# Patient Record
Sex: FEMALE | Race: WHITE | NOT HISPANIC OR LATINO | Employment: OTHER | URBAN - NONMETROPOLITAN AREA
[De-identification: names, ages, dates, MRNs, and addresses within clinical notes are randomized per-mention and may not be internally consistent; named-entity substitution may affect disease eponyms.]

---

## 2018-01-22 ENCOUNTER — TRANSCRIBE ORDERS (OUTPATIENT)
Dept: MAMMOGRAPHY | Facility: HOSPITAL | Age: 58
End: 2018-01-22

## 2018-01-22 DIAGNOSIS — Z12.39 BREAST CANCER SCREENING: Primary | ICD-10-CM

## 2024-06-18 ENCOUNTER — PREP FOR SURGERY (OUTPATIENT)
Dept: OTHER | Facility: HOSPITAL | Age: 64
End: 2024-06-18
Payer: MEDICARE

## 2024-06-18 DIAGNOSIS — H25.11 NUCLEAR SCLEROTIC CATARACT OF RIGHT EYE: Primary | ICD-10-CM

## 2024-06-18 RX ORDER — SODIUM CHLORIDE 9 MG/ML
40 INJECTION, SOLUTION INTRAVENOUS AS NEEDED
OUTPATIENT
Start: 2024-06-18

## 2024-06-18 RX ORDER — CYCLOPENT/TROPIC/PHEN/KETR/WAT 1%-1%-2.5%
1 DROPS (EA) OPHTHALMIC (EYE)
OUTPATIENT
Start: 2024-06-18 | End: 2024-06-18

## 2024-06-18 RX ORDER — TETRACAINE HYDROCHLORIDE 5 MG/ML
1 SOLUTION OPHTHALMIC SEE ADMIN INSTRUCTIONS
OUTPATIENT
Start: 2024-06-18

## 2024-06-18 RX ORDER — SODIUM CHLORIDE 0.9 % (FLUSH) 0.9 %
10 SYRINGE (ML) INJECTION EVERY 12 HOURS SCHEDULED
OUTPATIENT
Start: 2024-06-18

## 2024-06-18 RX ORDER — SODIUM CHLORIDE 0.9 % (FLUSH) 0.9 %
1-10 SYRINGE (ML) INJECTION AS NEEDED
OUTPATIENT
Start: 2024-06-18

## 2024-06-18 RX ORDER — PREDNISOLONE ACETATE 10 MG/ML
1 SUSPENSION/ DROPS OPHTHALMIC SEE ADMIN INSTRUCTIONS
OUTPATIENT
Start: 2024-06-18

## 2024-06-24 PROBLEM — H25.11 NUCLEAR SCLEROTIC CATARACT OF RIGHT EYE: Status: ACTIVE | Noted: 2024-06-18

## 2024-06-26 RX ORDER — HYDROXYZINE PAMOATE 25 MG/1
25 CAPSULE ORAL
COMMUNITY

## 2024-06-26 RX ORDER — OMEPRAZOLE 20 MG/1
20 CAPSULE, DELAYED RELEASE ORAL DAILY
COMMUNITY

## 2024-06-26 RX ORDER — FLUOXETINE HYDROCHLORIDE 20 MG/1
20 CAPSULE ORAL DAILY
COMMUNITY

## 2024-06-26 NOTE — PRE-PROCEDURE INSTRUCTIONS
PAT phone history completed with patient for upcoming procedure on 6/27/24 with Dr. Coley.    PAT PASS reviewed with patient and they verbalize understanding of the following:     Do not eat or drink anything after midnight the night before procedure unless otherwise instructed by physician/surgeon's office, this includes no gum, candy, mints, tobacco products or e-cigarettes.  Do not shave the area to be operated on at least 48 hours prior to procedure.  Do not wear makeup, lotion, hair products, or nail polish.  Do not wear any jewelry and remove all piercings.  Do not wear any adhesive if you wear dentures.  Do not wear contacts; bring in glasses if needed.  Only take medications on the morning of procedure as instructed by PAT nurse per anesthesia guidelines or as instructed by physician's office.  If you are on any blood thinners reach out to the physician/surgeon's office for instructions on when/if they will need to be stopped prior to procedure.  Bring in picture ID and insurance card, advanced directive copies if applicable, CPAP/BIPAP/Inhalers if indicated morning of procedure, leave any other valuables at home.  Ensure you have arranged for someone to drive you home the day of your procedure and someone to care for you at home afterwards. It is recommended that you do not drive, drink alcohol, or make any major legal decisions for at least 24 hours after your procedure is complete.    Instructions given on hospital entrance and registration location.

## 2024-06-27 ENCOUNTER — HOSPITAL ENCOUNTER (OUTPATIENT)
Facility: HOSPITAL | Age: 64
Setting detail: HOSPITAL OUTPATIENT SURGERY
Discharge: HOME | End: 2024-06-27
Attending: OPHTHALMOLOGY | Admitting: OPHTHALMOLOGY
Payer: MEDICARE

## 2024-06-27 ENCOUNTER — ANESTHESIA (OUTPATIENT)
Dept: PERIOP | Facility: HOSPITAL | Age: 64
End: 2024-06-27
Payer: MEDICARE

## 2024-06-27 ENCOUNTER — ANESTHESIA EVENT (OUTPATIENT)
Dept: PERIOP | Facility: HOSPITAL | Age: 64
End: 2024-06-27
Payer: MEDICARE

## 2024-06-27 VITALS
OXYGEN SATURATION: 97 % | WEIGHT: 196 LBS | SYSTOLIC BLOOD PRESSURE: 131 MMHG | TEMPERATURE: 97 F | HEIGHT: 60 IN | RESPIRATION RATE: 18 BRPM | DIASTOLIC BLOOD PRESSURE: 73 MMHG | HEART RATE: 74 BPM | BODY MASS INDEX: 38.48 KG/M2

## 2024-06-27 DIAGNOSIS — H25.11 NUCLEAR SCLEROTIC CATARACT OF RIGHT EYE: ICD-10-CM

## 2024-06-27 PROCEDURE — 25010000002 FENTANYL CITRATE (PF) 50 MCG/ML SOLUTION PREFILLED SYRINGE: Performed by: NURSE ANESTHETIST, CERTIFIED REGISTERED

## 2024-06-27 PROCEDURE — V2632 POST CHMBR INTRAOCULAR LENS: HCPCS | Performed by: OPHTHALMOLOGY

## 2024-06-27 PROCEDURE — 25810000003 LACTATED RINGERS PER 1000 ML: Performed by: OPHTHALMOLOGY

## 2024-06-27 PROCEDURE — 25010000002 MIDAZOLAM PER 1MG: Performed by: NURSE ANESTHETIST, CERTIFIED REGISTERED

## 2024-06-27 DEVICE — LENS MONOFOCAL IQ CC60WF220: Type: IMPLANTABLE DEVICE | Site: POSTERIOR CHAMBER | Status: FUNCTIONAL

## 2024-06-27 RX ORDER — CYCLOPENT/TROPIC/PHEN/KETR/WAT 1%-1%-2.5%
1 DROPS (EA) OPHTHALMIC (EYE)
Status: COMPLETED | OUTPATIENT
Start: 2024-06-27 | End: 2024-06-27

## 2024-06-27 RX ORDER — SODIUM CHLORIDE 0.9 % (FLUSH) 0.9 %
10 SYRINGE (ML) INJECTION EVERY 12 HOURS SCHEDULED
Status: DISCONTINUED | OUTPATIENT
Start: 2024-06-27 | End: 2024-06-27 | Stop reason: HOSPADM

## 2024-06-27 RX ORDER — ACETAZOLAMIDE 500 MG/1
500 CAPSULE, EXTENDED RELEASE ORAL ONCE
Status: DISCONTINUED | OUTPATIENT
Start: 2024-06-27 | End: 2024-06-27 | Stop reason: HOSPADM

## 2024-06-27 RX ORDER — SODIUM CHLORIDE, SODIUM LACTATE, POTASSIUM CHLORIDE, CALCIUM CHLORIDE 600; 310; 30; 20 MG/100ML; MG/100ML; MG/100ML; MG/100ML
1000 INJECTION, SOLUTION INTRAVENOUS CONTINUOUS
Status: DISCONTINUED | OUTPATIENT
Start: 2024-06-27 | End: 2024-06-27 | Stop reason: HOSPADM

## 2024-06-27 RX ORDER — BALANCED SALT SOLUTION 6.4; .75; .48; .3; 3.9; 1.7 MG/ML; MG/ML; MG/ML; MG/ML; MG/ML; MG/ML
SOLUTION OPHTHALMIC AS NEEDED
Status: DISCONTINUED | OUTPATIENT
Start: 2024-06-27 | End: 2024-06-27 | Stop reason: HOSPADM

## 2024-06-27 RX ORDER — SODIUM CHLORIDE 0.9 % (FLUSH) 0.9 %
1-10 SYRINGE (ML) INJECTION AS NEEDED
Status: DISCONTINUED | OUTPATIENT
Start: 2024-06-27 | End: 2024-06-27 | Stop reason: HOSPADM

## 2024-06-27 RX ORDER — LIDOCAINE HYDROCHLORIDE 40 MG/ML
INJECTION, SOLUTION RETROBULBAR AS NEEDED
Status: DISCONTINUED | OUTPATIENT
Start: 2024-06-27 | End: 2024-06-27 | Stop reason: HOSPADM

## 2024-06-27 RX ORDER — PREDNISOLONE ACETATE 10 MG/ML
SUSPENSION/ DROPS OPHTHALMIC AS NEEDED
Status: DISCONTINUED | OUTPATIENT
Start: 2024-06-27 | End: 2024-06-27 | Stop reason: HOSPADM

## 2024-06-27 RX ORDER — MIDAZOLAM HYDROCHLORIDE 2 MG/2ML
INJECTION, SOLUTION INTRAMUSCULAR; INTRAVENOUS AS NEEDED
Status: DISCONTINUED | OUTPATIENT
Start: 2024-06-27 | End: 2024-06-27 | Stop reason: SURG

## 2024-06-27 RX ORDER — FENTANYL CITRATE 50 UG/ML
INJECTION, SOLUTION INTRAMUSCULAR; INTRAVENOUS AS NEEDED
Status: DISCONTINUED | OUTPATIENT
Start: 2024-06-27 | End: 2024-06-27 | Stop reason: SURG

## 2024-06-27 RX ORDER — TETRACAINE HYDROCHLORIDE 5 MG/ML
SOLUTION OPHTHALMIC AS NEEDED
Status: DISCONTINUED | OUTPATIENT
Start: 2024-06-27 | End: 2024-06-27 | Stop reason: HOSPADM

## 2024-06-27 RX ORDER — PREDNISOLONE ACETATE 10 MG/ML
1 SUSPENSION/ DROPS OPHTHALMIC 4 TIMES DAILY
Start: 2024-06-27

## 2024-06-27 RX ORDER — TETRACAINE HYDROCHLORIDE 5 MG/ML
1 SOLUTION OPHTHALMIC SEE ADMIN INSTRUCTIONS
Status: COMPLETED | OUTPATIENT
Start: 2024-06-27 | End: 2024-06-27

## 2024-06-27 RX ORDER — SODIUM CHLORIDE 9 MG/ML
40 INJECTION, SOLUTION INTRAVENOUS AS NEEDED
Status: DISCONTINUED | OUTPATIENT
Start: 2024-06-27 | End: 2024-06-27 | Stop reason: HOSPADM

## 2024-06-27 RX ORDER — NEOMYCIN SULFATE, POLYMYXIN B SULFATE, AND DEXAMETHASONE 3.5; 10000; 1 MG/G; [USP'U]/G; MG/G
OINTMENT OPHTHALMIC AS NEEDED
Status: DISCONTINUED | OUTPATIENT
Start: 2024-06-27 | End: 2024-06-27 | Stop reason: HOSPADM

## 2024-06-27 RX ORDER — PREDNISOLONE ACETATE 10 MG/ML
1 SUSPENSION/ DROPS OPHTHALMIC SEE ADMIN INSTRUCTIONS
Status: DISCONTINUED | OUTPATIENT
Start: 2024-06-27 | End: 2024-06-27 | Stop reason: HOSPADM

## 2024-06-27 RX ADMIN — MIDAZOLAM HYDROCHLORIDE 2 MG: 1 INJECTION, SOLUTION INTRAMUSCULAR; INTRAVENOUS at 11:12

## 2024-06-27 RX ADMIN — TETRACAINE HYDROCHLORIDE 1 DROP: 5 SOLUTION OPHTHALMIC at 10:14

## 2024-06-27 RX ADMIN — SODIUM CHLORIDE, POTASSIUM CHLORIDE, SODIUM LACTATE AND CALCIUM CHLORIDE 1000 ML: 600; 310; 30; 20 INJECTION, SOLUTION INTRAVENOUS at 10:22

## 2024-06-27 RX ADMIN — Medication 1 DROP: at 10:25

## 2024-06-27 RX ADMIN — TETRACAINE HYDROCHLORIDE 1 DROP: 5 SOLUTION OPHTHALMIC at 10:13

## 2024-06-27 RX ADMIN — Medication 1 DROP: at 10:15

## 2024-06-27 RX ADMIN — Medication 1 DROP: at 10:20

## 2024-06-27 RX ADMIN — FENTANYL CITRATE 50 MCG: 50 INJECTION, SOLUTION INTRAMUSCULAR; INTRAVENOUS at 11:12

## 2024-06-27 NOTE — OP NOTE
OPERATIVE NOTE    Date of Procedure: 6/27/2024  Patient Name: Francesca Montana  Patient MRN: [id number]  YOB: 1960     Preoperative Diagnosis: Right nuclear sclerotic cataract.     Postoperative Diagnosis: Right nuclear sclerotic cataract.     Procedure Performed: Phacoemulsification with implantation of a foldable posterior chamber intraocular lens, Right eye.     Surgeon: Daron Coley MD    Anesthesia:  Monitored Anesthesia Care (MAC)      Brief History and Indication: The patient presents with a history of past progressive loss of vision.  Patient was diagnosed with cataract and requests removal for increased ability to read and see.     Operation Description: The patient was taken to the OR and prepped and draped in the usual sterile ophthalmic fashion. A lid speculum was placed in the eye.  A #75 blade was then used to make a stab incision two o’clock hours from the intended temporal clear cornea groove. The anterior chamber was then inflated with a Viscoelastic. A metal microkeratome blade was then used to enter the anterior chamber at the temporal clear cornea site. A three level tunnel incision was made. A curvilinear capsulorrhexis was then performed with a bent cystotome needle and capsulorrhexis forceps.  BSS on a 30 gauge bent cannula was used to hydro-dissect, and hydro-delineate the lens. Good fluid waves and hydro-delineation were noted. Phacoemulsification was then used to remove nuclear material without complications. The residual cortical and lenticular material was then removed with irrigation and aspiration. Viscoelastics were then used to inflate the bag in a soft shell technique. A PCIOL was injected into the bag. Post-implantation, there were no rents or tears in the bag and the lens was noted to be stable and centered. The residual Viscoelastic was then removed with irrigation and aspiration.  The wound was checked and found to be without leaks. Therefore a Polydex ointment and one  drop of Durezol eye drop was placed in the eye.     Implant Information:   Implant Name Type Inv. Item Serial No.  Lot No. LRB No. Used Action   LENS MONOFOCAL IQ [id number] - [id number] - [id number] Implant LENS MONOFOCAL IQ [id number] VIVIAN  Right 1 Implanted       Complications: None    Estimated Blood Loss:  Less than 1 cc.       []   Changed to complex procedure due to: []  hypermature, white cataract. Blue dye was used. []   small iris. A Malyugin Ring was used.    Discharge and Condition  The patient was transported to same day surgery in excellent condition and scheduled for follow-up tomorrow morning. The patient was given instructions on use of eye drops for the operative eye and was specifically instructed to call Dr. Coley at his office or home for any nausea, vomiting, headache, decreased visual acuity, or pain, or if the patient had any general concerns.    Daron Coley MD  6/27/2024

## 2024-06-27 NOTE — ANESTHESIA POSTPROCEDURE EVALUATION
Patient: Francesca Montana    Procedure Summary       Date: 06/27/24 Room / Location: [facility] OR 3 /  [facility]    Anesthesia Start: 1112 Anesthesia Stop: 1125    Procedure: CATARACT PHACO EXTRACTION WITH INTRAOCULAR LENS IMPLANT RIGHT (Right: Eye) Diagnosis:       Nuclear sclerotic cataract of right eye      (Nuclear sclerotic cataract of right eye [H25.11])    Surgeons: Daron Coley MD Provider: Manuel Mcdaniel CRNA    Anesthesia Type: MAC ASA Status: 2            Anesthesia Type: MAC    Vitals  No vitals data found for the desired time range.          Post Anesthesia Care and Evaluation    Patient location during evaluation: bedside  Patient participation: complete - patient participated  Level of consciousness: awake  Pain score: 0  Pain management: adequate    Airway patency: patent  Anesthetic complications: No anesthetic complications  PONV Status: controlled  Cardiovascular status: acceptable and stable  Respiratory status: acceptable and room air  Hydration status: acceptable    Comments: Vital signs as noted in nursing documentation as per protocol

## 2024-06-27 NOTE — DISCHARGE INSTRUCTIONS
[facility], [facility]  [address] [place] [address]  (P): [phone]           (F): [phone]    Francesca Montana  PATIENT NAME:__________________________________    Right Eye    POST OPERATIVE INSTRUCTIONS    You have received anesthesia today. DO NOT drive, drink alcohol, sign legal documents.   After surgery, your eye will not hurt. It may feel scratchy, sticky or uncomfortable. Your eye will be sensitive to light.  Most people see better 1-3 days after the procedure, but it could take 3 weeks to get the full benefits and reach your visual potential. If your vision is blurry for a few days it is normal, and means you may have swelling outside or inside the eye. For some patients, a bubble is placed and there will be blurriness.   You should receive a post-op kit with tape and an eye shield. Wear the shield for the first 3 nights after surgery to keep you from rubbing the eye.  Most people are able to return to their normal routine 1-3 days after surgery, however, due to the brain adjusting to your new vision you may have trouble judging distances and want to be careful when driving and going up and downstairs.   You can shower and wash your hair the day after surgery. Keep water, shampoo, hair spray and shaving lotion out of the eye, especially for the first week.  During the first week, you should AVOID:   Rubbing or putting pressure on your eye.  Eye make-up, face cream or lotion, hair coloring or perms  Strenuous activities, such as running or lifting weights, as to avoid sweat from getting in the eye. Avoid swimming, hot tubs, fumes or edinson conditions.   Keep your head above your heart (no hanging the head down for periods of time).  Some discomfort and blurred vision after surgery are normal, but if you have any unusual pain, swelling, bleeding or sudden decrease in vision, contact our office immediately. Emergency assistance is available at any time by calling:    Dr. Daron Paiz  [place]    [phone]    If unable to reach call [facility] @ [phone]      POST OPERATIVE DROP INSTRUCTIONS  You have been prescribed eye drops to use after surgery, please follow these instructions:  PLACE A JOAN IN THE DAY COLUMN EACH TIME YOU USE TO KEEP ON SCHEDULE. WAIT 5 MINUTES IN BETWEEN DROPS    Prednisolone (PINK TOP) SHAKE WELL BEFORE USE   GIVEN TO YOU BY THE HOSPITAL    WEEK 1-USE 4  (FOUR) TIMES A DAY DAY 1   DAY 2 DAY 3 DAY 4 DAY 5 DAY 6 DAY 7     WEEK 2-USE 3 (THREE)  TIMES A DAY DAY 1 DAY 2 DAY 3 DAY 4 DAY 5 DAY 6 DAY 7     WEEK 3-USE 2 (TWO) TIMES A DAY DAY 1 DAY 2 DAY 3 DAY 4 DAY 5 DAY 6 DAY 7     WEEK 4-USE 1 (ONE)TIME A DAY DAY 1 DAY 2 DAY 3 DAY 4 DAY 5 DAY 6 DAY 7         Ketorolac (GRAY TOP) PRESCRIBED TO YOUR PHARMACY    WEEK 1-USE 4  (FOUR) TIMES A DAY DAY 1   DAY 2 DAY 3 DAY 4 DAY 5 DAY 6 DAY 7     WEEK 2-USE 3 (THREE)  TIMES A DAY DAY 1 DAY 2 DAY 3 DAY 4 DAY 5 DAY 6 DAY 7     WEEK 3-USE 2 (TWO) TIMES A DAY DAY 1 DAY 2 DAY 3 DAY 4 DAY 5 DAY 6 DAY 7     WEEK 4-USE 1 (ONE)TIME A DAY DAY 1 DAY 2 DAY 3 DAY 4 DAY 5 DAY 6 DAY 7       Moxifloxacin (TAN TOP) PRESCRIBED TO YOUR PHARMACY    WEEK 1-USE 4  (FOUR) TIMES A DAY DAY 1   DAY 2 DAY 3 DAY 4 DAY 5 DAY 6 DAY 7       No pushing, pulling, tugging,  heavy lifting, or strenuous activity.  No major decision making, driving, or drinking alcoholic beverages for 24 hours. ( due to the medications you have  received)  Always use good hand hygiene/washing techniques.  NO driving while taking pain medications.    * if you have an incision:  Check your incision area every day for signs of infection.   Check for:  * more redness, swelling, or pain  *more fluid or blood  *warmth  *pus or bad smell    To assist you in voiding:  Drink plenty of fluids  Listen to running water while attempting to void.    If you are unable to urinate and you have an uncomfortable urge to void or it has been   6  hours since you were discharged, return to the Emergency Room

## 2024-06-27 NOTE — ANESTHESIA PREPROCEDURE EVALUATION
Anesthesia Evaluation     Patient summary reviewed and Nursing notes reviewed   NPO Solid Status: > 8 hours  NPO Liquid Status: > 8 hours           Airway   Mallampati: II  TM distance: >3 FB  Neck ROM: full  Possible difficult intubation  Dental - normal exam     Pulmonary - normal exam   Cardiovascular - normal exam        Neuro/Psych  (+) psychiatric history Depression  GI/Hepatic/Renal/Endo    (+) GERD well controlled    Musculoskeletal     Abdominal  - normal exam   Substance History      OB/GYN          Other                    Anesthesia Plan    ASA 2     MAC     intravenous induction     Anesthetic plan, risks, benefits, and alternatives have been provided, discussed and informed consent has been obtained with: patient.  Pre-procedure education provided  Plan discussed with CRNA.    CODE STATUS:

## 2024-07-16 ENCOUNTER — PREP FOR SURGERY (OUTPATIENT)
Dept: OTHER | Facility: HOSPITAL | Age: 64
End: 2024-07-16
Payer: MEDICARE

## 2024-07-16 DIAGNOSIS — H25.12 NUCLEAR SCLEROTIC CATARACT OF LEFT EYE: Primary | ICD-10-CM

## 2024-07-16 RX ORDER — PREDNISOLONE ACETATE 10 MG/ML
1 SUSPENSION/ DROPS OPHTHALMIC SEE ADMIN INSTRUCTIONS
OUTPATIENT
Start: 2024-07-16

## 2024-07-16 RX ORDER — SODIUM CHLORIDE 0.9 % (FLUSH) 0.9 %
1-10 SYRINGE (ML) INJECTION AS NEEDED
OUTPATIENT
Start: 2024-07-16

## 2024-07-16 RX ORDER — TETRACAINE HYDROCHLORIDE 5 MG/ML
1 SOLUTION OPHTHALMIC SEE ADMIN INSTRUCTIONS
OUTPATIENT
Start: 2024-07-16

## 2024-07-16 RX ORDER — CYCLOPENT/TROPIC/PHEN/KETR/WAT 1%-1%-2.5%
1 DROPS (EA) OPHTHALMIC (EYE)
OUTPATIENT
Start: 2024-07-16 | End: 2024-07-16

## 2024-07-16 RX ORDER — SODIUM CHLORIDE 0.9 % (FLUSH) 0.9 %
10 SYRINGE (ML) INJECTION EVERY 12 HOURS SCHEDULED
OUTPATIENT
Start: 2024-07-16

## 2024-07-16 RX ORDER — SODIUM CHLORIDE 9 MG/ML
40 INJECTION, SOLUTION INTRAVENOUS AS NEEDED
OUTPATIENT
Start: 2024-07-16

## 2024-07-22 PROBLEM — H25.12 NUCLEAR SCLEROTIC CATARACT OF LEFT EYE: Status: ACTIVE | Noted: 2024-07-16

## 2024-07-24 ENCOUNTER — ANESTHESIA EVENT (OUTPATIENT)
Dept: PERIOP | Facility: HOSPITAL | Age: 64
End: 2024-07-24
Payer: MEDICARE

## 2024-07-24 NOTE — PRE-PROCEDURE INSTRUCTIONS
PAT phone history completed with patient for upcoming procedure on 7/25/24 with Dr. Coley.    PAT PASS reviewed with patient and they verbalize understanding of the following:     Do not eat or drink anything after midnight the night before procedure unless otherwise instructed by physician/surgeon's office, this includes no gum, candy, mints, tobacco products or e-cigarettes.  Do not shave the area to be operated on at least 48 hours prior to procedure.  Do not wear makeup, lotion, hair products, or nail polish.  Do not wear any jewelry and remove all piercings.  Do not wear any adhesive if you wear dentures.  Do not wear contacts; bring in glasses if needed.  Only take medications on the morning of procedure as instructed by PAT nurse per anesthesia guidelines or as instructed by physician's office.  If you are on any blood thinners reach out to the physician/surgeon's office for instructions on when/if they will need to be stopped prior to procedure.  Bring in picture ID and insurance card, advanced directive copies if applicable, CPAP/BIPAP/Inhalers if indicated morning of procedure, leave any other valuables at home.  Ensure you have arranged for someone to drive you home the day of your procedure and someone to care for you at home afterwards. It is recommended that you do not drive, drink alcohol, or make any major legal decisions for at least 24 hours after your procedure is complete.    Instructions given on hospital entrance and registration location.

## 2024-07-24 NOTE — ANESTHESIA PREPROCEDURE EVALUATION
Anesthesia Evaluation     Patient summary reviewed and Nursing notes reviewed   NPO Solid Status: > 8 hours  NPO Liquid Status: > 8 hours           Airway   Mallampati: II  TM distance: >3 FB  Neck ROM: full  Possible difficult intubation  Dental    (+) edentulous    Pulmonary - normal exam   Cardiovascular - normal exam    Rhythm: regular  Rate: normal        Neuro/Psych  (+) psychiatric history Depression  GI/Hepatic/Renal/Endo    (+) GERD well controlled    Musculoskeletal     Abdominal  - normal exam   Substance History      OB/GYN          Other        ROS/Med Hx Other: [place]                  Anesthesia Plan    ASA 2     MAC     intravenous induction     Anesthetic plan, risks, benefits, and alternatives have been provided, discussed and informed consent has been obtained with: patient.  Pre-procedure education provided  Plan discussed with CRNA.      CODE STATUS:

## 2024-07-25 ENCOUNTER — ANESTHESIA (OUTPATIENT)
Dept: PERIOP | Facility: HOSPITAL | Age: 64
End: 2024-07-25
Payer: MEDICARE

## 2024-07-25 ENCOUNTER — HOSPITAL ENCOUNTER (OUTPATIENT)
Facility: HOSPITAL | Age: 64
Setting detail: HOSPITAL OUTPATIENT SURGERY
Discharge: HOME | End: 2024-07-25
Attending: OPHTHALMOLOGY | Admitting: OPHTHALMOLOGY
Payer: MEDICARE

## 2024-07-25 VITALS
HEART RATE: 79 BPM | OXYGEN SATURATION: 97 % | BODY MASS INDEX: 38.48 KG/M2 | RESPIRATION RATE: 17 BRPM | WEIGHT: 196 LBS | TEMPERATURE: 97 F | HEIGHT: 60 IN | SYSTOLIC BLOOD PRESSURE: 142 MMHG | DIASTOLIC BLOOD PRESSURE: 75 MMHG

## 2024-07-25 DIAGNOSIS — H25.12 NUCLEAR SCLEROTIC CATARACT OF LEFT EYE: ICD-10-CM

## 2024-07-25 PROCEDURE — 25010000002 MIDAZOLAM PER 1MG: Performed by: NURSE ANESTHETIST, CERTIFIED REGISTERED

## 2024-07-25 PROCEDURE — 25810000003 LACTATED RINGERS PER 1000 ML: Performed by: OPHTHALMOLOGY

## 2024-07-25 PROCEDURE — 25010000002 FENTANYL CITRATE (PF) 50 MCG/ML SOLUTION PREFILLED SYRINGE: Performed by: NURSE ANESTHETIST, CERTIFIED REGISTERED

## 2024-07-25 PROCEDURE — V2632 POST CHMBR INTRAOCULAR LENS: HCPCS | Performed by: OPHTHALMOLOGY

## 2024-07-25 DEVICE — LENS MONOFOCAL IQ CC60WF215: Type: IMPLANTABLE DEVICE | Site: POSTERIOR CHAMBER | Status: FUNCTIONAL

## 2024-07-25 RX ORDER — SODIUM CHLORIDE 0.9 % (FLUSH) 0.9 %
10 SYRINGE (ML) INJECTION EVERY 12 HOURS SCHEDULED
Status: DISCONTINUED | OUTPATIENT
Start: 2024-07-25 | End: 2024-07-25 | Stop reason: HOSPADM

## 2024-07-25 RX ORDER — LIDOCAINE HYDROCHLORIDE 40 MG/ML
INJECTION, SOLUTION RETROBULBAR AS NEEDED
Status: DISCONTINUED | OUTPATIENT
Start: 2024-07-25 | End: 2024-07-25 | Stop reason: HOSPADM

## 2024-07-25 RX ORDER — PREDNISOLONE ACETATE 10 MG/ML
1 SUSPENSION/ DROPS OPHTHALMIC SEE ADMIN INSTRUCTIONS
Status: DISCONTINUED | OUTPATIENT
Start: 2024-07-25 | End: 2024-07-25 | Stop reason: HOSPADM

## 2024-07-25 RX ORDER — KETAMINE HCL IN NACL, ISO-OSM 100MG/10ML
SYRINGE (ML) INJECTION AS NEEDED
Status: DISCONTINUED | OUTPATIENT
Start: 2024-07-25 | End: 2024-07-25 | Stop reason: SURG

## 2024-07-25 RX ORDER — TETRACAINE HYDROCHLORIDE 5 MG/ML
1 SOLUTION OPHTHALMIC SEE ADMIN INSTRUCTIONS
Status: COMPLETED | OUTPATIENT
Start: 2024-07-25 | End: 2024-07-25

## 2024-07-25 RX ORDER — FENTANYL CITRATE 50 UG/ML
INJECTION, SOLUTION INTRAMUSCULAR; INTRAVENOUS AS NEEDED
Status: DISCONTINUED | OUTPATIENT
Start: 2024-07-25 | End: 2024-07-25 | Stop reason: SURG

## 2024-07-25 RX ORDER — SODIUM CHLORIDE 9 MG/ML
40 INJECTION, SOLUTION INTRAVENOUS AS NEEDED
Status: DISCONTINUED | OUTPATIENT
Start: 2024-07-25 | End: 2024-07-25 | Stop reason: HOSPADM

## 2024-07-25 RX ORDER — SODIUM CHLORIDE 0.9 % (FLUSH) 0.9 %
1-10 SYRINGE (ML) INJECTION AS NEEDED
Status: DISCONTINUED | OUTPATIENT
Start: 2024-07-25 | End: 2024-07-25 | Stop reason: HOSPADM

## 2024-07-25 RX ORDER — CYCLOPENT/TROPIC/PHEN/KETR/WAT 1%-1%-2.5%
1 DROPS (EA) OPHTHALMIC (EYE)
Status: COMPLETED | OUTPATIENT
Start: 2024-07-25 | End: 2024-07-25

## 2024-07-25 RX ORDER — TETRACAINE HYDROCHLORIDE 5 MG/ML
SOLUTION OPHTHALMIC AS NEEDED
Status: DISCONTINUED | OUTPATIENT
Start: 2024-07-25 | End: 2024-07-25 | Stop reason: HOSPADM

## 2024-07-25 RX ORDER — BALANCED SALT SOLUTION 6.4; .75; .48; .3; 3.9; 1.7 MG/ML; MG/ML; MG/ML; MG/ML; MG/ML; MG/ML
SOLUTION OPHTHALMIC AS NEEDED
Status: DISCONTINUED | OUTPATIENT
Start: 2024-07-25 | End: 2024-07-25 | Stop reason: HOSPADM

## 2024-07-25 RX ORDER — IPRATROPIUM BROMIDE AND ALBUTEROL SULFATE 2.5; .5 MG/3ML; MG/3ML
3 SOLUTION RESPIRATORY (INHALATION) ONCE AS NEEDED
Status: DISCONTINUED | OUTPATIENT
Start: 2024-07-25 | End: 2024-07-25 | Stop reason: HOSPADM

## 2024-07-25 RX ORDER — SODIUM CHLORIDE, SODIUM LACTATE, POTASSIUM CHLORIDE, CALCIUM CHLORIDE 600; 310; 30; 20 MG/100ML; MG/100ML; MG/100ML; MG/100ML
1000 INJECTION, SOLUTION INTRAVENOUS CONTINUOUS
Status: DISCONTINUED | OUTPATIENT
Start: 2024-07-25 | End: 2024-07-25 | Stop reason: HOSPADM

## 2024-07-25 RX ORDER — PREDNISOLONE ACETATE 10 MG/ML
SUSPENSION/ DROPS OPHTHALMIC AS NEEDED
Status: DISCONTINUED | OUTPATIENT
Start: 2024-07-25 | End: 2024-07-25 | Stop reason: HOSPADM

## 2024-07-25 RX ORDER — NEOMYCIN SULFATE, POLYMYXIN B SULFATE, AND DEXAMETHASONE 3.5; 10000; 1 MG/G; [USP'U]/G; MG/G
OINTMENT OPHTHALMIC AS NEEDED
Status: DISCONTINUED | OUTPATIENT
Start: 2024-07-25 | End: 2024-07-25 | Stop reason: HOSPADM

## 2024-07-25 RX ORDER — MIDAZOLAM HYDROCHLORIDE 2 MG/2ML
INJECTION, SOLUTION INTRAMUSCULAR; INTRAVENOUS AS NEEDED
Status: DISCONTINUED | OUTPATIENT
Start: 2024-07-25 | End: 2024-07-25 | Stop reason: SURG

## 2024-07-25 RX ADMIN — Medication 10 MG: at 11:58

## 2024-07-25 RX ADMIN — TETRACAINE HYDROCHLORIDE 1 DROP: 5 SOLUTION OPHTHALMIC at 10:20

## 2024-07-25 RX ADMIN — Medication 1 DROP: at 10:27

## 2024-07-25 RX ADMIN — MIDAZOLAM HYDROCHLORIDE 2 MG: 1 INJECTION, SOLUTION INTRAMUSCULAR; INTRAVENOUS at 11:58

## 2024-07-25 RX ADMIN — Medication 1 DROP: at 10:32

## 2024-07-25 RX ADMIN — Medication 1 DROP: at 10:22

## 2024-07-25 RX ADMIN — TETRACAINE HYDROCHLORIDE 1 DROP: 5 SOLUTION OPHTHALMIC at 10:21

## 2024-07-25 RX ADMIN — FENTANYL CITRATE 50 MCG: 50 INJECTION, SOLUTION INTRAMUSCULAR; INTRAVENOUS at 11:58

## 2024-07-25 RX ADMIN — SODIUM CHLORIDE, POTASSIUM CHLORIDE, SODIUM LACTATE AND CALCIUM CHLORIDE 1000 ML: 600; 310; 30; 20 INJECTION, SOLUTION INTRAVENOUS at 10:42

## 2024-07-25 NOTE — H&P
[facility]         History and Physical    Patient Name: Francesca Montana  MRN: [id number]  : 1960  Gender: female     HPI: Patient complaint of PPLOV Left eye diagnosed with cataract. Patient requests PHACO PCIOL for Increase of VA/ADL.    History:    Past Medical History:   Diagnosis Date    Depression     GERD (gastroesophageal reflux disease)     Seasonal allergies     Wears glasses        Past Surgical History:   Procedure Laterality Date    ADENOIDECTOMY      CATARACT EXTRACTION W/ INTRAOCULAR LENS IMPLANT Right 2024    Procedure: CATARACT PHACO EXTRACTION WITH INTRAOCULAR LENS IMPLANT RIGHT;  Surgeon: Daron Coley MD;  Location: [facility];  Service: Ophthalmology;  Laterality: Right;       Social History     Socioeconomic History    Marital status: Single   Tobacco Use    Smoking status: Never    Smokeless tobacco: Never   Vaping Use    Vaping status: Never Used   Substance and Sexual Activity    Alcohol use: Not Currently    Drug use: Never    Sexual activity: Defer       History reviewed. No pertinent family history.    Prior to Admission Medications:  Medications Prior to Admission   Medication Sig Dispense Refill Last Dose    FLUoxetine (PROzac) 20 MG capsule Take 1 capsule by mouth Daily.   Past Week    hydrOXYzine pamoate (VISTARIL) 25 MG capsule Take 1 capsule by mouth every night at bedtime.   Past Week    omeprazole (priLOSEC) 20 MG capsule Take 1 capsule by mouth Daily.   Past Week    prednisoLONE acetate (Pred Forte) 1 % ophthalmic suspension Administer 1 drop to the right eye 4 (Four) Times a Day. (Patient taking differently: Administer 1 drop to the right eye 2 (Two) Times a Day.)          Allergies:  Allergies   Allergen Reactions    Sulfa Antibiotics Other (See Comments)     Doesn't know the reaction.        Vitals: Temp:  [97.1 °F (36.2 °C)] 97.1 °F (36.2 °C)  Heart Rate:  [80] 80  Resp:  [16] 16  BP: (128)/(64) 128/64    Review of Systems:    Within Normal Limits Abnormal   HEENT [x]    []     Cardiovascular [x]   []     Gastrointestinal [x]   []     Genitourinary [x]   []     Neurologic [x]   []     Pulmonary [x]   []       Physical Exam:   Within Normal Limits Abnormal   HEENT [x]    []     Heart [x]   []     Lungs [x]   []     Abdomen [x]   []     Extremities [x]   []       Impression: Left nuclear sclerotic cataract.     Plan: CATARACT PHACO EXTRACTION WITH INTRAOCULAR LENS IMPLANT LEFT (Left)     Daron Coley MD  7/25/2024

## 2024-07-25 NOTE — ANESTHESIA POSTPROCEDURE EVALUATION
Patient: Francesca Montana    Procedure Summary       Date: 07/25/24 Room / Location: [facility] OR 3 /  [facility]    Anesthesia Start: 1154 Anesthesia Stop: 1219    Procedure: CATARACT PHACO EXTRACTION WITH INTRAOCULAR LENS IMPLANT LEFT (Left: Eye) Diagnosis:       Nuclear sclerotic cataract of left eye      (Nuclear sclerotic cataract of left eye [H25.12])    Surgeons: Daron Coley MD Provider: Amy Rothman CRNA    Anesthesia Type: MAC ASA Status: 2            Anesthesia Type: MAC    Vitals  Vitals Value Taken Time   /75 07/25/24 1251   Temp 97 °F (36.1 °C) 07/25/24 1221   Pulse 79 07/25/24 1251   Resp 17 07/25/24 1251   SpO2 97 % 07/25/24 1251           Post Anesthesia Care and Evaluation    Patient location during evaluation: bedside  Patient participation: complete - patient participated  Level of consciousness: awake and alert  Pain score: 0  Pain management: satisfactory to patient    Airway patency: patent  Anesthetic complications: No anesthetic complications  PONV Status: none  Cardiovascular status: acceptable and stable  Respiratory status: acceptable  Hydration status: acceptable    Comments: Vitals signs as noted in nursing documentation as per protocol.

## 2024-07-25 NOTE — DISCHARGE INSTRUCTIONS
[facility], [facility]  [address] [place] [address]  (P): [phone]           (F): [phone]    Francesca Montana  PATIENT NAME:__________________________________    Left Eye    POST OPERATIVE INSTRUCTIONS    You have received anesthesia today. DO NOT drive, drink alcohol, sign legal documents.   After surgery, your eye will not hurt. It may feel scratchy, sticky or uncomfortable. Your eye will be sensitive to light.  Most people see better 1-3 days after the procedure, but it could take 3 weeks to get the full benefits and reach your visual potential. If your vision is blurry for a few days it is normal, and means you may have swelling outside or inside the eye. For some patients, a bubble is placed and there will be blurriness.   You should receive a post-op kit with tape and an eye shield. Wear the shield for the first 3 nights after surgery to keep you from rubbing the eye.  Most people are able to return to their normal routine 1-3 days after surgery, however, due to the brain adjusting to your new vision you may have trouble judging distances and want to be careful when driving and going up and downstairs.   You can shower and wash your hair the day after surgery. Keep water, shampoo, hair spray and shaving lotion out of the eye, especially for the first week.  During the first week, you should AVOID:   Rubbing or putting pressure on your eye.  Eye make-up, face cream or lotion, hair coloring or perms  Strenuous activities, such as running or lifting weights, as to avoid sweat from getting in the eye. Avoid swimming, hot tubs, fumes or edinson conditions.   Keep your head above your heart (no hanging the head down for periods of time).  Some discomfort and blurred vision after surgery are normal, but if you have any unusual pain, swelling, bleeding or sudden decrease in vision, contact our office immediately. Emergency assistance is available at any time by calling:    Dr. Daron Paiz  [place]    [phone]          [phone]    If unable to reach call [facility] @ [phone]      POST OPERATIVE DROP INSTRUCTIONS  You have been prescribed eye drops to use after surgery, please follow these instructions:  PLACE A JOAN IN THE DAY COLUMN EACH TIME YOU USE TO KEEP ON SCHEDULE. WAIT 5 MINUTES IN BETWEEN DROPS    Prednisolone (PINK TOP) SHAKE WELL BEFORE USE   GIVEN TO YOU BY THE HOSPITAL    WEEK 1-USE 4  (FOUR) TIMES A DAY DAY 1   DAY 2 DAY 3 DAY 4 DAY 5 DAY 6 DAY 7     WEEK 2-USE 3 (THREE)  TIMES A DAY DAY 1 DAY 2 DAY 3 DAY 4 DAY 5 DAY 6 DAY 7     WEEK 3-USE 2 (TWO) TIMES A DAY DAY 1 DAY 2 DAY 3 DAY 4 DAY 5 DAY 6 DAY 7     WEEK 4-USE 1 (ONE)TIME A DAY DAY 1 DAY 2 DAY 3 DAY 4 DAY 5 DAY 6 DAY 7         Ketorolac (GRAY TOP) PRESCRIBED TO YOUR PHARMACY    WEEK 1-USE 4  (FOUR) TIMES A DAY DAY 1   DAY 2 DAY 3 DAY 4 DAY 5 DAY 6 DAY 7     WEEK 2-USE 3 (THREE)  TIMES A DAY DAY 1 DAY 2 DAY 3 DAY 4 DAY 5 DAY 6 DAY 7     WEEK 3-USE 2 (TWO) TIMES A DAY DAY 1 DAY 2 DAY 3 DAY 4 DAY 5 DAY 6 DAY 7     WEEK 4-USE 1 (ONE)TIME A DAY DAY 1 DAY 2 DAY 3 DAY 4 DAY 5 DAY 6 DAY 7       Moxifloxacin (TAN TOP) PRESCRIBED TO YOUR PHARMACY    WEEK 1-USE 4  (FOUR) TIMES A DAY DAY 1   DAY 2 DAY 3 DAY 4 DAY 5 DAY 6 DAY 7     Please follow all post op instructions and follow up appointment time from your physician's office included in your discharge packet.  . No pushing, pulling, tugging,  heavy lifting, or strenuous activity.  No major decision making, driving, or drinking alcoholic beverages for 24 hours. ( due to the medications you have  received)  Always use good hand hygiene/washing techniques.  NO driving while taking pain medications.    * if you have an incision:  Check your incision area every day for signs of infection.   Check for:  * more redness, swelling, or pain  *more fluid or blood  *warmth  *pus or bad smell    To assist you in voiding:  Drink plenty of fluids  Listen to  running water while attempting to void.    If you are unable to urinate and you have an uncomfortable urge to void or it has been   6 hours since you were discharged, return to the Emergency Room

## 2024-07-25 NOTE — OP NOTE
OPERATIVE NOTE    Date of Procedure: 7/25/2024  Patient Name: Francesca Montana  Patient MRN: [id number]  YOB: 1960     Preoperative Diagnosis: Left nuclear sclerotic cataract.     Postoperative Diagnosis: Left nuclear sclerotic cataract.     Procedure Performed: Phacoemulsification with implantation of a foldable posterior chamber intraocular lens, Left eye.     Surgeon: Daron Coley MD    Anesthesia:  Monitored Anesthesia Care (MAC)      Brief History and Indication: The patient presents with a history of past progressive loss of vision.  Patient was diagnosed with cataract and requests removal for increased ability to read and see.     Operation Description: The patient was taken to the OR and prepped and draped in the usual sterile ophthalmic fashion. A lid speculum was placed in the eye.  A #75 blade was then used to make a stab incision two o’clock hours from the intended temporal clear cornea groove. The anterior chamber was then inflated with a Viscoelastic. A metal microkeratome blade was then used to enter the anterior chamber at the temporal clear cornea site. A three level tunnel incision was made. A curvilinear capsulorrhexis was then performed with a bent cystotome needle and capsulorrhexis forceps.  BSS on a 30 gauge bent cannula was used to hydro-dissect, and hydro-delineate the lens. Good fluid waves and hydro-delineation were noted. Phacoemulsification was then used to remove nuclear material without complications. The residual cortical and lenticular material was then removed with irrigation and aspiration. Viscoelastics were then used to inflate the bag in a soft shell technique. A PCIOL was injected into the bag. Post-implantation, there were no rents or tears in the bag and the lens was noted to be stable and centered. The residual Viscoelastic was then removed with irrigation and aspiration.  The wound was checked and found to be without leaks. Therefore a Polydex ointment and one  drop of Durezol eye drop was placed in the eye.     Implant Information:   Implant Name Type Inv. Item Serial No.  Lot No. LRB No. Used Action   LENS MONOFOCAL IQ [id number] - [id number] - [id number] Implant LENS MONOFOCAL IQ [id number] VIVIAN  Left 1 Implanted       Complications: None    Estimated Blood Loss:  Less than 1 cc.       []   Changed to complex procedure due to: []  hypermature, white cataract. Blue dye was used. []   small iris. A Malyugin Ring was used.    Discharge and Condition  The patient was transported to same day surgery in excellent condition and scheduled for follow-up tomorrow morning. The patient was given instructions on use of eye drops for the operative eye and was specifically instructed to call Dr. Coley at his office or home for any nausea, vomiting, headache, decreased visual acuity, or pain, or if the patient had any general concerns.    Daron Coley MD  7/25/2024

## (undated) DEVICE — Device

## (undated) DEVICE — HP CONCL INTREPID COAX I/A CRV .3MM

## (undated) DEVICE — SYR LUERLOK 5CC

## (undated) DEVICE — GLV SURG SENSICARE PI LF PF 8 GRN STRL

## (undated) DEVICE — CANN IRR/INJ AIR ANT CHAMBER 6MM BEND 27G

## (undated) DEVICE — CLEARCUT® HP2 SLIT KNIFE INTREPID MICRO-COAXIAL SYSTEM 2.4 DB: Brand: CLEARCUT®; INTREPID

## (undated) DEVICE — THE MONARCH® "D" CARTRIDGE IS A SINGLE-USE POLYPROPYLENE CARTRIDGE FOR POSTERIOR CHAMBER IOL DELIVERY: Brand: MONARCH® III

## (undated) DEVICE — BLD BEAVER MICROSHARP 15D 3MM BLU LF

## (undated) DEVICE — SOL IRRIG H2O 1000ML STRL

## (undated) DEVICE — SHIELD CORNL PROTECT CLR UNIV